# Patient Record
Sex: FEMALE | Race: OTHER | NOT HISPANIC OR LATINO | URBAN - METROPOLITAN AREA
[De-identification: names, ages, dates, MRNs, and addresses within clinical notes are randomized per-mention and may not be internally consistent; named-entity substitution may affect disease eponyms.]

---

## 2017-11-06 ENCOUNTER — OUTPATIENT (OUTPATIENT)
Dept: OUTPATIENT SERVICES | Facility: HOSPITAL | Age: 77
LOS: 1 days | Discharge: HOME | End: 2017-11-06

## 2017-11-06 DIAGNOSIS — Z01.818 ENCOUNTER FOR OTHER PREPROCEDURAL EXAMINATION: ICD-10-CM

## 2017-11-06 DIAGNOSIS — M16.9 OSTEOARTHRITIS OF HIP, UNSPECIFIED: ICD-10-CM

## 2017-11-06 DIAGNOSIS — M16.12 UNILATERAL PRIMARY OSTEOARTHRITIS, LEFT HIP: ICD-10-CM

## 2017-11-27 ENCOUNTER — INPATIENT (INPATIENT)
Facility: HOSPITAL | Age: 77
LOS: 1 days | Discharge: HOME | End: 2017-11-29
Attending: ORTHOPAEDIC SURGERY

## 2017-11-27 DIAGNOSIS — M16.9 OSTEOARTHRITIS OF HIP, UNSPECIFIED: ICD-10-CM

## 2017-11-30 DIAGNOSIS — I10 ESSENTIAL (PRIMARY) HYPERTENSION: ICD-10-CM

## 2017-11-30 DIAGNOSIS — Z85.3 PERSONAL HISTORY OF MALIGNANT NEOPLASM OF BREAST: ICD-10-CM

## 2017-11-30 DIAGNOSIS — Z96.641 PRESENCE OF RIGHT ARTIFICIAL HIP JOINT: ICD-10-CM

## 2017-11-30 DIAGNOSIS — M16.12 UNILATERAL PRIMARY OSTEOARTHRITIS, LEFT HIP: ICD-10-CM

## 2017-11-30 DIAGNOSIS — Z87.891 PERSONAL HISTORY OF NICOTINE DEPENDENCE: ICD-10-CM

## 2017-11-30 DIAGNOSIS — Z90.11 ACQUIRED ABSENCE OF RIGHT BREAST AND NIPPLE: ICD-10-CM

## 2022-07-26 ENCOUNTER — APPOINTMENT (OUTPATIENT)
Dept: ORTHOPEDIC SURGERY | Facility: CLINIC | Age: 82
End: 2022-07-26

## 2022-07-26 PROBLEM — Z00.00 ENCOUNTER FOR PREVENTIVE HEALTH EXAMINATION: Status: ACTIVE | Noted: 2022-07-26

## 2022-07-26 PROCEDURE — 99213 OFFICE O/P EST LOW 20 MIN: CPT

## 2022-07-26 RX ORDER — IBUPROFEN 600 MG/1
600 TABLET, FILM COATED ORAL
Qty: 60 | Refills: 0 | Status: ACTIVE | COMMUNITY
Start: 2022-03-04

## 2022-07-26 RX ORDER — HYDROCHLOROTHIAZIDE 25 MG/1
25 TABLET ORAL
Qty: 90 | Refills: 0 | Status: ACTIVE | COMMUNITY
Start: 2022-01-03

## 2022-07-26 RX ORDER — LOSARTAN POTASSIUM 25 MG/1
25 TABLET, FILM COATED ORAL
Qty: 90 | Refills: 0 | Status: ACTIVE | COMMUNITY
Start: 2022-07-08

## 2022-07-26 NOTE — DISCUSSION/SUMMARY
[de-identified] :   Today we discussed a treatment plan.  I recommend formal therapy, Rx provided for that.  She was also given a printout from the [facility] for shoulder conditioning program.  She may continue taking aspirin at night.  She is not interested in surgery for this full-thickness rotator cuff tear.  I will see her back in 2 months for further evaluation.\par \par Supervising physician:  Dr. oB

## 2022-07-26 NOTE — PHYSICAL EXAM
[Left] : left shoulder [] : motor and sensory intact distally [FreeTextEntry8] :   Tenderness to palpation over the biceps and deltoid [de-identified] :  Weakness with rotator cuff resistance testing. [TWNoteComboBox7] : active forward flexion 170 degrees [de-identified] : active abduction 150 degrees [TWNoteComboBox6] : internal rotation L2

## 2022-07-26 NOTE — HISTORY OF PRESENT ILLNESS
[de-identified] : The patient is an 81-year-old female here for subsequent re-evaluation of her left shoulder.  She has a full-thickness rotator cuff tear.  She had an injection here on 03/07/2022 which provided her with some relief.  She gets pain at night, when she has pain she takes aspirin and provides her with good relief.  She is using the gym as well.  She points over the biceps and deltoid as to where her pain is.

## 2022-10-03 ENCOUNTER — APPOINTMENT (OUTPATIENT)
Dept: ORTHOPEDIC SURGERY | Facility: CLINIC | Age: 82
End: 2022-10-03

## 2022-10-03 DIAGNOSIS — M75.122 COMPLETE ROTATOR CUFF TEAR OR RUPTURE OF LEFT SHOULDER, NOT SPECIFIED AS TRAUMATIC: ICD-10-CM

## 2022-10-03 PROCEDURE — 99213 OFFICE O/P EST LOW 20 MIN: CPT

## 2022-10-03 NOTE — PHYSICAL EXAM
[Left] : left shoulder [] : motor and sensory intact distally [de-identified] :  Significant weakness with rotator cuff resistance testing. [TWNoteComboBox7] : active forward flexion 180 degrees [de-identified] : active abduction 175 degrees [TWNoteComboBox6] : internal rotation L3

## 2022-10-03 NOTE — HISTORY OF PRESENT ILLNESS
[de-identified] : The patient is an 82-year-old female here for subsequent re-evaluation of her left shoulder.  She has a full-thickness rotator cuff tear.  She is doing home therapy exercises for the shoulder.  She does get pain on a daily basis, severity waxes and wanes.  She takes Advil or aspirin which provided her with good relief.

## 2022-10-03 NOTE — DISCUSSION/SUMMARY
[de-identified] :   At this point the patient is still not interested in surgery to address this full-thickness rotator cuff tear.  I recommend she continues home therapy exercises.  I showed her how to do rotator cuff and deltoid strengthening here in the office.  I will see her back in 2 months for further evaluation.\par \par Supervising physician:  Dr. Blake

## 2022-12-05 ENCOUNTER — APPOINTMENT (OUTPATIENT)
Dept: ORTHOPEDIC SURGERY | Facility: CLINIC | Age: 82
End: 2022-12-05

## 2025-07-05 ENCOUNTER — EMERGENCY (EMERGENCY)
Facility: HOSPITAL | Age: 85
LOS: 0 days | Discharge: HOME | End: 2025-07-05
Attending: EMERGENCY MEDICINE
Payer: MEDICARE

## 2025-07-05 VITALS
SYSTOLIC BLOOD PRESSURE: 168 MMHG | DIASTOLIC BLOOD PRESSURE: 79 MMHG | WEIGHT: 145.06 LBS | TEMPERATURE: 97 F | OXYGEN SATURATION: 97 % | RESPIRATION RATE: 18 BRPM | HEART RATE: 72 BPM

## 2025-07-05 DIAGNOSIS — L23.7 ALLERGIC CONTACT DERMATITIS DUE TO PLANTS, EXCEPT FOOD: ICD-10-CM

## 2025-07-05 DIAGNOSIS — L29.9 PRURITUS, UNSPECIFIED: ICD-10-CM

## 2025-07-05 DIAGNOSIS — R21 RASH AND OTHER NONSPECIFIC SKIN ERUPTION: ICD-10-CM

## 2025-07-05 PROCEDURE — 99282 EMERGENCY DEPT VISIT SF MDM: CPT

## 2025-07-05 PROCEDURE — 99284 EMERGENCY DEPT VISIT MOD MDM: CPT

## 2025-07-05 RX ORDER — PREDNISONE 20 MG/1
0 TABLET ORAL
Qty: 18 | Refills: 0
Start: 2025-07-05

## 2025-07-05 RX ORDER — HYDROCORTISONE 10 MG/G
1 CREAM TOPICAL
Qty: 1 | Refills: 0
Start: 2025-07-05 | End: 2025-07-25

## 2025-07-05 NOTE — ED PROVIDER NOTE - PATIENT PORTAL LINK FT
You can access the FollowMyHealth Patient Portal offered by [facility] by registering at the following website: [email or web address]. By joining [employer]s FollowMyHealth portal, you will also be able to view your health information using other applications (apps) compatible with our system.

## 2025-07-05 NOTE — ED PROVIDER NOTE - CLINICAL SUMMARY MEDICAL DECISION MAKING FREE TEXT BOX
Pt is 84 years old. Discussion regarding natural history of poison IVY. topical safer than oral. Pt agrees. Will prescribe triamcinolone ointment.

## 2025-07-05 NOTE — ED PROVIDER NOTE - PHYSICAL EXAMINATION
Physical Exam    Vital Signs: I have reviewed the initial vital signs.  Constitutional: well-nourished, appears stated age, no acute distress  Skin: + vesicular appearing rash noted to face, trunk and arms consistent with contact dermatitis/poison ivy   Neuro: AOx3, No focal deficits noted

## 2025-07-05 NOTE — ED PROVIDER NOTE - ATTENDING APP SHARED VISIT CONTRIBUTION OF CARE
Pt working in garden. For the last few days notes rash to hands, arms, chest and face. PE small blisters scattered on finger, erythema right arm, erythema scattered on face and chest. Most consistent with poison ivy.